# Patient Record
Sex: FEMALE | URBAN - METROPOLITAN AREA
[De-identification: names, ages, dates, MRNs, and addresses within clinical notes are randomized per-mention and may not be internally consistent; named-entity substitution may affect disease eponyms.]

---

## 2018-07-26 ENCOUNTER — OFFICE VISIT (OUTPATIENT)
Dept: CARDIOLOGY | Facility: MEDICAL CENTER | Age: 68
End: 2018-07-26
Payer: MEDICARE

## 2018-07-26 ENCOUNTER — TELEPHONE (OUTPATIENT)
Dept: CARDIOLOGY | Facility: MEDICAL CENTER | Age: 68
End: 2018-07-26

## 2018-07-26 VITALS
DIASTOLIC BLOOD PRESSURE: 90 MMHG | OXYGEN SATURATION: 98 % | HEART RATE: 98 BPM | HEIGHT: 64 IN | WEIGHT: 156 LBS | BODY MASS INDEX: 26.63 KG/M2 | SYSTOLIC BLOOD PRESSURE: 140 MMHG

## 2018-07-26 DIAGNOSIS — D50.0 IRON DEFICIENCY ANEMIA DUE TO CHRONIC BLOOD LOSS: ICD-10-CM

## 2018-07-26 DIAGNOSIS — I48.19 PERSISTENT ATRIAL FIBRILLATION (HCC): ICD-10-CM

## 2018-07-26 DIAGNOSIS — I48.91 ATRIAL FIBRILLATION, UNSPECIFIED TYPE (HCC): ICD-10-CM

## 2018-07-26 PROCEDURE — 93000 ELECTROCARDIOGRAM COMPLETE: CPT | Performed by: INTERNAL MEDICINE

## 2018-07-26 PROCEDURE — 99204 OFFICE O/P NEW MOD 45 MIN: CPT | Performed by: INTERNAL MEDICINE

## 2018-07-26 RX ORDER — DOXEPIN HYDROCHLORIDE 50 MG/1
50 CAPSULE ORAL NIGHTLY
COMMUNITY

## 2018-07-26 ASSESSMENT — ENCOUNTER SYMPTOMS
EYES NEGATIVE: 1
MUSCULOSKELETAL NEGATIVE: 1
SHORTNESS OF BREATH: 0
NAUSEA: 0
PALPITATIONS: 0
BRUISES/BLEEDS EASILY: 0
HEARTBURN: 0
DEPRESSION: 0
WEIGHT LOSS: 0
LOSS OF CONSCIOUSNESS: 0
INSOMNIA: 0
NERVOUS/ANXIOUS: 0
DIZZINESS: 0
COUGH: 0

## 2018-07-26 NOTE — LETTER
"     [facility]   [address], [address]  [place] [address]  Phone: [phone]  Fax: [phone]              Silvestre Lindsay  1950    Encounter Date: 7/26/2018    Denise Ritchie M.D.          PROGRESS NOTE:  Chief Complaint   Patient presents with   • Atrial Fibrillation     new patient       Subjective:   Silvestre Lindsay is a 67 y.o. female who presents today as a new patient.  She is sent in consultation by Dr. Key in [place] in regards to her question of A. fib with anemia    I do not have the tracings from her recent Holter.  He says in his notes that there is A. fib noted.  He then did an EKG that showed sinus arrhythmia.  In the meanwhile she had a hemoglobin of 4.  She received iron and blood transfusions.  She now has a normal hemoglobin but is waiting for a colonoscopy until \"cleared\"    Retired  originally from [employer], very healthy and active no symptoms    Medical history is reviewed and unremarkable for any serious life-threatening chronic illnesses such as cancer, heart disease.  She follows up voices siderosis of healthy    No surgical history outside of waiting for colonoscopy    No family history of sudden cardiac death or premature coronary disease, father lived to be 99      Social History     Social History   • Marital status: Unknown     Spouse name: N/A   • Number of children: N/A   • Years of education: N/A     Occupational History   • Not on file.     Social History Main Topics   • Smoking status: Never Smoker   • Smokeless tobacco: Never Used   • Alcohol use No   • Drug use: Unknown   • Sexual activity: Not on file     Other Topics Concern   • Not on file     Social History Narrative   • No narrative on file     Allergies   Allergen Reactions   • Penicillins      itch     Outpatient Encounter Prescriptions as of 7/26/2018   Medication Sig Dispense Refill   • Multiple Vitamins-Minerals (CENTRUM SILVER PO) Take  by mouth. " "    • doxepin (SINEQUAN) 50 MG Cap Take 50 mg by mouth every evening.       No facility-administered encounter medications on file as of 7/26/2018.      Review of Systems   Constitutional: Negative for malaise/fatigue and weight loss.   Eyes: Negative.    Respiratory: Negative for cough and shortness of breath.    Cardiovascular: Negative for chest pain, palpitations and leg swelling.   Gastrointestinal: Negative for heartburn and nausea.   Musculoskeletal: Negative.    Neurological: Negative for dizziness and loss of consciousness.   Endo/Heme/Allergies: Does not bruise/bleed easily.   Psychiatric/Behavioral: Negative for depression. The patient is not nervous/anxious and does not have insomnia.    All other systems reviewed and are negative.       Objective:   /90   Pulse 98   Ht 1.613 m (5' 3.5\")   Wt 70.8 kg (156 lb)   SpO2 98%   BMI 27.20 kg/m²      Physical Exam   Constitutional: She is oriented to person, place, and time. She appears well-developed and well-nourished.   HENT:   Head: Normocephalic and atraumatic.   Eyes: Pupils are equal, round, and reactive to light. EOM are normal.   Neck: No JVD present. No thyromegaly present.   Cardiovascular: Normal rate, regular rhythm and intact distal pulses.    No murmur heard.  Pulmonary/Chest: Breath sounds normal. No respiratory distress. She exhibits no tenderness.   Abdominal: Bowel sounds are normal. She exhibits no distension.   Musculoskeletal: She exhibits no edema or tenderness.   Lymphadenopathy:     She has no cervical adenopathy.   Neurological: She is alert and oriented to person, place, and time. She exhibits normal muscle tone. Coordination normal.   Skin: Skin is warm and dry. No rash noted.   Psychiatric: She has a normal mood and affect. Her behavior is normal.       Assessment:     1. Atrial fibrillation, unspecified type (HCC)  EKG    Echocardiogram Comp w/o Cont   2. Persistent atrial fibrillation (HCC)     3. Iron deficiency anemia " due to chronic blood loss         Medical Decision Making:  Today's Assessment / Status / Plan:     Twelve-lead EKG done here and reviewed by me shows sinus rhythm no ST abnormalities normal EKG    Concern for arrhythmia/A. Fib  None noted  Severe anemia pending workup, would certainly not hesitate to proceed  Normal cardiac exam would do echocardiogram to look at left atrial size and rule out significant structural heart disease  Follow-up as needed  I did again request the Holter monitor    Spent more than 45 minutes discussing pathophysiology of fibrillation and talking about treatment such as aspirin and/or anticoagulation if noted, these will not be prescribed now with her risk of hemorrhage    I will contact her after testing      Shady Doty D.O.  [address]  [facility] [address]  VIA Facsimile: [phone]

## 2018-07-26 NOTE — PROGRESS NOTES
"Chief Complaint   Patient presents with   • Atrial Fibrillation     new patient       Subjective:   Silvestre Lindsay is a 67 y.o. female who presents today as a new patient.  She is sent in consultation by Dr. Key in [place] in regards to her question of A. fib with anemia    I do not have the tracings from her recent Holter.  He says in his notes that there is A. fib noted.  He then did an EKG that showed sinus arrhythmia.  In the meanwhile she had a hemoglobin of 4.  She received iron and blood transfusions.  She now has a normal hemoglobin but is waiting for a colonoscopy until \"cleared\"    Retired  originally from [place], very healthy and active no symptoms    Medical history is reviewed and unremarkable for any serious life-threatening chronic illnesses such as cancer, heart disease.  She follows up voices siderosis of healthy    No surgical history outside of waiting for colonoscopy    No family history of sudden cardiac death or premature coronary disease, father lived to be       Social History     Social History   • Marital status: Unknown     Spouse name: N/A   • Number of children: N/A   • Years of education: N/A     Occupational History   • Not on file.     Social History Main Topics   • Smoking status: Never Smoker   • Smokeless tobacco: Never Used   • Alcohol use No   • Drug use: Unknown   • Sexual activity: Not on file     Other Topics Concern   • Not on file     Social History Narrative   • No narrative on file     Allergies   Allergen Reactions   • Penicillins      itch     Outpatient Encounter Prescriptions as of 7/26/2018   Medication Sig Dispense Refill   • Multiple Vitamins-Minerals (CENTRUM SILVER PO) Take  by mouth.     • doxepin (SINEQUAN) 50 MG Cap Take 50 mg by mouth every evening.       No facility-administered encounter medications on file as of 7/26/2018.      Review of Systems   Constitutional: Negative for malaise/fatigue and weight loss.   Eyes: Negative.  " "  Respiratory: Negative for cough and shortness of breath.    Cardiovascular: Negative for chest pain, palpitations and leg swelling.   Gastrointestinal: Negative for heartburn and nausea.   Musculoskeletal: Negative.    Neurological: Negative for dizziness and loss of consciousness.   Endo/Heme/Allergies: Does not bruise/bleed easily.   Psychiatric/Behavioral: Negative for depression. The patient is not nervous/anxious and does not have insomnia.    All other systems reviewed and are negative.       Objective:   /90   Pulse 98   Ht 1.613 m (5' 3.5\")   Wt 70.8 kg (156 lb)   SpO2 98%   BMI 27.20 kg/m²     Physical Exam   Constitutional: She is oriented to person, place, and time. She appears well-developed and well-nourished.   HENT:   Head: Normocephalic and atraumatic.   Eyes: Pupils are equal, round, and reactive to light. EOM are normal.   Neck: No JVD present. No thyromegaly present.   Cardiovascular: Normal rate, regular rhythm and intact distal pulses.    No murmur heard.  Pulmonary/Chest: Breath sounds normal. No respiratory distress. She exhibits no tenderness.   Abdominal: Bowel sounds are normal. She exhibits no distension.   Musculoskeletal: She exhibits no edema or tenderness.   Lymphadenopathy:     She has no cervical adenopathy.   Neurological: She is alert and oriented to person, place, and time. She exhibits normal muscle tone. Coordination normal.   Skin: Skin is warm and dry. No rash noted.   Psychiatric: She has a normal mood and affect. Her behavior is normal.       Assessment:     1. Atrial fibrillation, unspecified type (HCC)  EKG    Echocardiogram Comp w/o Cont   2. Persistent atrial fibrillation (HCC)     3. Iron deficiency anemia due to chronic blood loss         Medical Decision Making:  Today's Assessment / Status / Plan:     Twelve-lead EKG done here and reviewed by me shows sinus rhythm no ST abnormalities normal EKG    Concern for arrhythmia/A. Fib  None noted  Severe anemia " pending workup, would certainly not hesitate to proceed  Normal cardiac exam would do echocardiogram to look at left atrial size and rule out significant structural heart disease  Follow-up as needed  I did again request the Holter monitor    Spent more than 45 minutes discussing pathophysiology of fibrillation and talking about treatment such as aspirin and/or anticoagulation if noted, these will not be prescribed now with her risk of hemorrhage    I will contact her after testing

## 2018-07-27 LAB — EKG IMPRESSION: NORMAL

## 2018-08-08 ENCOUNTER — DOCUMENTATION (OUTPATIENT)
Dept: CARDIOLOGY | Facility: MEDICAL CENTER | Age: 68
End: 2018-08-08

## 2018-08-14 ENCOUNTER — DOCUMENTATION (OUTPATIENT)
Dept: CARDIOLOGY | Facility: MEDICAL CENTER | Age: 68
End: 2018-08-14